# Patient Record
Sex: MALE | Race: WHITE | URBAN - METROPOLITAN AREA
[De-identification: names, ages, dates, MRNs, and addresses within clinical notes are randomized per-mention and may not be internally consistent; named-entity substitution may affect disease eponyms.]

---

## 2017-03-10 RX ORDER — PRAVASTATIN SODIUM 20 MG/1
TABLET ORAL
Qty: 30 TAB | Refills: 5 | Status: SHIPPED | OUTPATIENT
Start: 2017-03-10 | End: 2017-07-08 | Stop reason: SDUPTHER

## 2017-07-07 ENCOUNTER — OFFICE VISIT (OUTPATIENT)
Dept: INTERNAL MEDICINE CLINIC | Age: 57
End: 2017-07-07

## 2017-07-07 VITALS
HEART RATE: 58 BPM | TEMPERATURE: 98 F | RESPIRATION RATE: 16 BRPM | OXYGEN SATURATION: 98 % | WEIGHT: 195 LBS | HEIGHT: 71 IN | DIASTOLIC BLOOD PRESSURE: 82 MMHG | SYSTOLIC BLOOD PRESSURE: 122 MMHG | BODY MASS INDEX: 27.3 KG/M2

## 2017-07-07 DIAGNOSIS — E78.5 HYPERLIPIDEMIA, UNSPECIFIED HYPERLIPIDEMIA TYPE: ICD-10-CM

## 2017-07-07 DIAGNOSIS — Z00.00 WELLNESS EXAMINATION: Primary | ICD-10-CM

## 2017-07-07 DIAGNOSIS — J32.9 SINUSITIS, UNSPECIFIED CHRONICITY, UNSPECIFIED LOCATION: ICD-10-CM

## 2017-07-07 DIAGNOSIS — I10 ESSENTIAL HYPERTENSION: ICD-10-CM

## 2017-07-07 DIAGNOSIS — R97.20 RISING PSA LEVEL: ICD-10-CM

## 2017-07-07 DIAGNOSIS — G47.33 OSA (OBSTRUCTIVE SLEEP APNEA): Chronic | ICD-10-CM

## 2017-07-07 RX ORDER — GUAIFENESIN 600 MG/1
600 TABLET, EXTENDED RELEASE ORAL 2 TIMES DAILY
Qty: 14 TAB | Refills: 0 | Status: SHIPPED | OUTPATIENT
Start: 2017-07-07 | End: 2017-07-14

## 2017-07-07 RX ORDER — FLUTICASONE PROPIONATE 50 MCG
2 SPRAY, SUSPENSION (ML) NASAL DAILY
Qty: 1 BOTTLE | Refills: 3 | Status: SHIPPED | OUTPATIENT
Start: 2017-07-07

## 2017-07-07 NOTE — PROGRESS NOTES
Connie Drew is a 61 y.o. male who presents today for No chief complaint on file. Jose Miguel Terrazas 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 Today patient ***. he {DOES_DOES QQS:82217} have other concerns. ROS  ROS    There were no vitals taken for this visit. Physical Exam      Current Outpatient Prescriptions   Medication Sig    pravastatin (PRAVACHOL) 20 mg tablet take 1 tablet by mouth once daily at bedtime    cetirizine (ZYRTEC) 5 mg tablet Take  by mouth.  clonazePAM (KLONOPIN) 1 mg tablet Take  by mouth two (2) times a day. No current facility-administered medications for this visit.          Past Medical History:   Diagnosis Date    Anxiety     Autonomic dysreflexia     Benign essential HTN     Dr. Imani Pratt prescribes clonazepam    Chronic sinusitis     recurrent, s/p 2 sinus surgeries    FH: CAD (coronary artery disease)     Hemorrhoids     banding 2013, 2015. saw Dr. Cindi Mcwilliams 2016    Hyperlipidemia 9/8/2010    Hypoglycemia     Low back pain     L leg    N[facility]psy with cataplexy     [facility]    LD (obstructive sleep apnea)     [facility]    Panic attack       Past Surgical History:   Procedure Laterality Date    COLONOSCOPY  2008    hemorrhoids    HX GI  7/17/14    flex sig    HX HEENT      sinus surgeries x2      Social History   Substance Use Topics    Smoking status: Former Smoker     Packs/day: 0.50     Years: 6.00     Types: Cigarettes     Quit date: 4/10/2002    Smokeless tobacco: Never Used    Alcohol use 1.0 oz/week     2 Cans of beer per week      Comment: very seldom       Family History   Problem Relation Age of Onset    Anxiety Mother     Breast Cancer Mother     Heart Disease Father      CABG, CAD    Hypertension Brother     High Cholesterol Brother     Hypertension Sister     Diabetes Maternal Uncle     Heart Attack Brother      stent    Cancer Neg Hx         Allergies   Allergen Reactions    Bactrim Ds [Sulfamethoxazole-Trimethoprim] Hives    Augmentin [Amoxicillin-Pot Clavulanate] Nausea Only    Novocain [Procaine] Palpitations    Paxil [Paroxetine Hcl] Other (comments)     Libido problems        Prednisone Other (comments)     tachycardia      Simvastatin Other (comments)     Sexual effects        Assessment/Plan  {Assessment and Plan Chronic Disease:82976}    Follow-up Disposition: Not on File    Callie Valles MD  7/7/2017

## 2017-07-07 NOTE — PROGRESS NOTES
Risa Sam is a 61 y.o. male who presents today for Complete Physical (pt is fasting)  . 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 Today patient is here for CPE. he does have other concerns. Anxiety: Dr. Vanesa Dobson with [facility]. On Klonopin 0.5 TID. Stable on this. Has had some ear issue. Fullness, equilibrium is a bit off. Has had two cefdinir Rx. Continued issues. LD: on CPAP,     Hyperlipidemia  Currently he takes 20 mg of pravastatin. ROS: taking medications as instructed, no medication side effects noted  No new myalgias, no joint pains, no weakness  No TIA's, no chest pain on exertion, no dyspnea on exertion, no swelling of ankles. Lab Results   Component Value Date/Time    Cholesterol, total 258 06/27/2016 09:30 AM    HDL Cholesterol 62 06/27/2016 09:30 AM    LDL, calculated 173 06/27/2016 09:30 AM    VLDL, calculated 23 06/27/2016 09:30 AM    Triglyceride 114 06/27/2016 09:30 AM       Health maintenance hx includes:  Exercise: moderately active. Diet: generally follows a low fat low cholesterol diet, nonsmoker, alcohol intake socially  Social: Works at [employer] with [employer]. One adult child. No grandchildren. Cancer screening:    Colon cancer screening:  Last Colonoscopy: 2008 and was normal   Prostate cancer screening: PSA and/or JASON: 2016 and was normal, but had some increase. Immunizations:     Immunization History   Administered Date(s) Administered    TD Vaccine 08/04/2005    Tdap 04/01/2016      Immunization status: up to date and documented.       ROS  Review of Systems   Constitutional: Negative for chills, fever, malaise/fatigue and weight loss. HENT: Positive for congestion, ear pain and hearing loss. Negative for sore throat and tinnitus. Eyes: Negative for blurred vision, double vision, photophobia, pain and discharge. Respiratory: Negative for cough, hemoptysis, sputum production, shortness of breath and wheezing. Cardiovascular: Negative for chest pain, palpitations, orthopnea, claudication and leg swelling. Gastrointestinal: Negative for abdominal pain, blood in stool, constipation, diarrhea, heartburn, nausea and vomiting. Genitourinary: Negative for dysuria, frequency, hematuria and urgency. Musculoskeletal: Negative for back pain, joint pain, myalgias and neck pain. Skin: Negative for itching and rash. Endo/Heme/Allergies: Does not bruise/bleed easily. Psychiatric/Behavioral: Negative for depression, memory loss and suicidal ideas. Visit Vitals    /86 (BP 1 Location: Left arm, BP Patient Position: Sitting)    Pulse (!) 58    Temp 98 °F (36.7 °C) (Oral)    Resp 16    Ht 5' 11\" (1.803 m)    Wt 195 lb (88.5 kg)    SpO2 98%    BMI 27.2 kg/m2       Physical Exam   Constitutional: He appears well-developed and well-nourished. HENT:   Head: Normocephalic and atraumatic. Right Ear: External ear normal.   Left Ear: External ear normal.   Fluid behind TM   Eyes: EOM are normal. Pupils are equal, round, and reactive to light. Neck: Normal range of motion. Neck supple. Cardiovascular: Normal rate and regular rhythm. No murmur heard. Pulmonary/Chest: Effort normal and breath sounds normal. No respiratory distress. Abdominal: Soft. Bowel sounds are normal. He exhibits no distension. Current Outpatient Prescriptions   Medication Sig    pravastatin (PRAVACHOL) 20 mg tablet take 1 tablet by mouth once daily at bedtime    cetirizine (ZYRTEC) 5 mg tablet Take  by mouth.     clonazePAM (KLONOPIN) 1 mg tablet Take  by mouth two (2) times a day. No current facility-administered medications for this visit. Past Medical History:   Diagnosis Date    Anxiety     Autonomic dysreflexia     Benign essential HTN     Dr. Ling Montes prescribes clonazepam    Chronic sinusitis     recurrent, s/p 2 sinus surgeries    FH: CAD (coronary artery disease)     Hemorrhoids     banding 2013, 2015. saw Dr. Tio Coffey 2016    Hyperlipidemia 9/8/2010    Hypoglycemia     Low back pain     L leg    N[facility]psy with cataplexy     [facility]    LD (obstructive sleep apnea)     [facility]    Panic attack       Past Surgical History:   Procedure Laterality Date    COLONOSCOPY  2008    hemorrhoids    HX GI  7/17/14    flex sig    HX HEENT      sinus surgeries x2      Social History   Substance Use Topics    Smoking status: Former Smoker     Packs/day: 0.50     Years: 6.00     Types: Cigarettes     Quit date: 4/10/2002    Smokeless tobacco: Never Used    Alcohol use 1.0 oz/week     2 Cans of beer per week      Comment: very seldom       Family History   Problem Relation Age of Onset    Anxiety Mother     Breast Cancer Mother     Heart Disease Father      CABG, CAD    Hypertension Brother     High Cholesterol Brother     Hypertension Sister     Diabetes Maternal Uncle     Heart Attack Brother      stent    Cancer Neg Hx         Allergies   Allergen Reactions    Bactrim Ds [Sulfamethoxazole-Trimethoprim] Hives    Augmentin [Amoxicillin-Pot Clavulanate] Nausea Only    Novocain [Procaine] Palpitations    Paxil [Paroxetine Hcl] Other (comments)     Libido problems        Prednisone Other (comments)     tachycardia      Simvastatin Other (comments)     Sexual effects        Assessment/Plan  Cathie Hogan was seen today for complete physical.    Diagnoses and all orders for this visit:    Wellness examination - PSA was rising, repeat. Pt to continue physical activity. C-scope due next year.    Yessi Suarez was counseled on age-appropriate/ guideline-based risk prevention behaviors and screening for a 61y.o. year old   male . We also discussed adjustments in screening based on family history if necessary. Printed instructions for preventative screening guidelines and healthy behaviors given to patient with after visit summary.    -     METABOLIC PANEL, COMPREHENSIVE    Hyperlipidemia, unspecified hyperlipidemia type - on pravachol 20mg. Check levels. -     LIPID PANEL    Essential hypertension - diet/lifestyle controlled. Rising PSA level  -     PSA W/ REFLX FREE PSA    LD (obstructive sleep apnea) - on CPAP    Sinusitis, unspecified chronicity, unspecified location - s/p two rounds of omnicef. No new infectious symptoms. Try nasal steroids/mucinex. ENT if not better. -     fluticasone (FLONASE) 50 mcg/actuation nasal spray; 2 Sprays by Both Nostrils route daily.  -     guaiFENesin ER (MUCINEX) 600 mg ER tablet; Take 1 Tab by mouth two (2) times a day for 7 days.         Follow-up Disposition: Not on File    Lela Mendez MD  7/7/2017

## 2017-07-07 NOTE — MR AVS SNAPSHOT
Visit Information Date & Time Provider Department Dept. Phone Encounter #  
 7/7/2017  8:30 AM Garcia Whiting MD [facility] [address] [place] [id number] Upcoming Health Maintenance Date Due COLONOSCOPY 1/1/2018 INFLUENZA AGE 9 TO ADULT 8/1/2017 DTaP/Tdap/Td series (2 - Td) 4/1/2026 Allergies as of 7/7/2017  Review Complete On: 7/7/2017 By: Garcia Whiting MD  
  
 Severity Noted Reaction Type Reactions Bactrim Ds [Sulfamethoxazole-trimethoprim] High 02/02/2011   Systemic Hives Augmentin [Amoxicillin-pot Clavulanate]  04/10/2009    Nausea Only Novocain [Procaine]  04/10/2009    Palpitations Paxil [Paroxetine Hcl]  04/10/2009    Other (comments) Libido problems Prednisone  04/10/2009    Other (comments)  
 tachycardia Simvastatin  10/03/2013    Other (comments) Sexual effects Current Immunizations  Reviewed on 4/1/2016 Name Date  
 TD Vaccine 8/4/2005 Tdap 4/1/2016 Not reviewed this visit You Were Diagnosed With   
  
 Codes Comments Wellness examination    -  Primary ICD-10-CM: Z00.00 ICD-9-CM: V70.0 Hyperlipidemia, unspecified hyperlipidemia type     ICD-10-CM: E78.5 ICD-9-CM: 272.4 Essential hypertension     ICD-10-CM: I10 
ICD-9-CM: 401.9 Rising PSA level     ICD-10-CM: R97.20 ICD-9-CM: 790.93   
 LD (obstructive sleep apnea)     ICD-10-CM: G47.33 
ICD-9-CM: 327.23 Sinusitis, unspecified chronicity, unspecified location     ICD-10-CM: J32.9 ICD-9-CM: 473.9 Vitals BP Pulse Temp Resp Height(growth percentile) Weight(growth percentile) 122/82 (BP 1 Location: Left arm, BP Patient Position: Sitting) (!) 58 98 °F (36.7 °C) (Oral) 16 5' 11\" (1.803 m) 195 lb (88.5 kg) SpO2 BMI Smoking Status 98% 27.2 kg/m2 Former Smoker Vitals History BMI and BSA Data Body Mass Index Body Surface Area  
 27.2 kg/m 2 2.11 m 2 [facility]erred [facility] [address] [facility] [address] [phone] Your Updated Medication List  
  
   
This list is accurate as of: 17 10:01 AM.  Always use your most recent med list.  
  
  
  
  
 cetirizine 5 mg tablet Commonly known as:  ZYRTEC Take  by mouth.  
  
 clonazePAM 1 mg tablet Commonly known as:  Kaykay Cord Take  by mouth two (2) times a day. fluticasone 50 mcg/actuation nasal spray Commonly known as:  Cristi Calk 2 Sprays by Both Nostrils route daily. guaiFENesin  mg ER tablet Commonly known as:  Manuel & Manuel Take 1 Tab by mouth two (2) times a day for 7 days. pravastatin 20 mg tablet Commonly known as:  PRAVACHOL  
take 1 tablet by mouth once daily at bedtime Prescriptions Sent to Pharmacy Refills  
 fluticasone (FLONASE) 50 mcg/actuation nasal spray 3 Si Sprays by Both Nostrils route daily. Class: Normal  
 [facility] #: [phone] Route: Both Nostrils  
 guaiFENesin ER (MUCINEX) 600 mg ER tablet 0 Sig: Take 1 Tab by mouth two (2) times a day for 7 days. Class: Normal  
 [facility] Ph #: [phone] Route: Oral  
  
We Performed the Following LIPID PANEL [84215 CPT(R)] METABOLIC PANEL, COMPREHENSIVE [98373 CPT(R)] PSA W/ REFLX FREE PSA [73462 CPT(R)] Patient Instructions Well Visit, Men 48 to 72: Care Instructions Your Care Instructions Physical exams can help you stay healthy. Your doctor has checked your overall health and may have suggested ways to take good care of yourself. He or she also may have recommended tests. At home, you can help prevent illness with healthy eating, regular exercise, and other steps. Follow-up care is a key part of your treatment and safety.  Be sure to make and go to all appointments, and call your doctor if you are having problems. It's also a good idea to know your test results and keep a list of the medicines you take. How can you care for yourself at home? · Reach and stay at a healthy weight. This will lower your risk for many problems, such as obesity, diabetes, heart disease, and high blood pressure. · Get at least 30 minutes of exercise on most days of the week. Walking is a good choice. You also may want to do other activities, such as running, swimming, cycling, or playing tennis or team sports. · Do not smoke. Smoking can make health problems worse. If you need help quitting, talk to your doctor about stop-smoking programs and medicines. These can increase your chances of quitting for good. · Protect your skin from too much sun. When you're outdoors from 10 a.m. to 4 p.m., stay in the shade or cover up with clothing and a hat with a wide brim. Wear sunglasses that block UV rays. Even when it's cloudy, put broad-spectrum sunscreen (SPF 30 or higher) on any exposed skin. · See a dentist one or two times a year for checkups and to have your teeth cleaned. · Wear a seat belt in the car. · Limit alcohol to 2 drinks a day. Too much alcohol can cause health problems. Follow your doctor's advice about when to have certain tests. These tests can spot problems early. · Cholesterol. Your doctor will tell you how often to have this done based on your overall health and other things that can increase your risk for heart attack and stroke. · Blood pressure. Have your blood pressure checked during a routine doctor visit. Your doctor will tell you how often to check your blood pressure based on your age, your blood pressure results, and other factors. · Prostate exam. Talk to your doctor about whether you should have a blood test (called a PSA test) for prostate cancer.  Experts disagree on whether men should have this test. Some experts recommend that you discuss the benefits and risks of the test with your doctor. · Diabetes. Ask your doctor whether you should have tests for diabetes. · Vision. Some experts recommend that you have yearly exams for glaucoma and other age-related eye problems starting at age 48. · Hearing. Tell your doctor if you notice any change in your hearing. You can have tests to find out how well you hear. · Colon cancer. You should begin tests for colon cancer at age 48. You may have one of several tests. Your doctor will tell you how often to have tests based on your age and risk. Risks include whether you already had a precancerous polyp removed from your colon or whether your parent, brother, sister, or child has had colon cancer. · Heart attack and stroke risk. At least every 4 to 6 years, you should have your risk for heart attack and stroke assessed. Your doctor uses factors such as your age, blood pressure, cholesterol, and whether you smoke or have diabetes to show what your risk for a heart attack or stroke is over the next 10 years. · Abdominal aortic aneurysm. Ask your doctor whether you should have a test to check for an aneurysm. You may need a test if you ever smoked or if your parent, brother, sister, or child has had an aneurysm. When should you call for help? Watch closely for changes in your health, and be sure to contact your doctor if you have any problems or symptoms that concern you. Where can you learn more? Go to http://day-susana.info/. Enter P885 in the search box to learn more about \"Well Visit, Men 48 to 72: Care Instructions. \" Current as of: July 19, 2016 Content Version: 11.3 © 4361-3365 Healthwise, Incorporated. Care instructions adapted under license by [employer] (which disclaims liability or warranty for this information).  If you have questions about a medical condition or this instruction, always ask your healthcare professional. [address] any warranty or liability for your use of this information. Introducing [facility]! Dear Nadeem Ybarra: Thank you for requesting a [employer] account. Our records indicate that you already have an active [employer] account. You can access your account anytime at [email or web address] Did you know that you can access your hospital and ER discharge instructions at any time in [employer]? You can also review all of your test results from your hospital stay or ER visit. Additional Information If you have questions, please visit the Frequently Asked Questions section of the [employer] website at [email or web address] Remember, [employer] is NOT to be used for urgent needs. For medical emergencies, dial 911. Now available from your iPhone and Android! Please provide this summary of care documentation to your next provider. Your primary care clinician is listed as Kaelyn Sotelo. If you have any questions after today's visit, please call [phone].

## 2017-07-07 NOTE — PATIENT INSTRUCTIONS

## 2017-07-08 DIAGNOSIS — E78.5 HYPERLIPIDEMIA, UNSPECIFIED HYPERLIPIDEMIA TYPE: Primary | ICD-10-CM

## 2017-07-08 LAB
ALBUMIN SERPL-MCNC: 4.5 G/DL (ref 3.5–5.5)
ALBUMIN/GLOB SERPL: 1.6 {RATIO} (ref 1.2–2.2)
ALP SERPL-CCNC: 73 IU/L (ref 39–117)
ALT SERPL-CCNC: 23 IU/L (ref 0–44)
AST SERPL-CCNC: 20 IU/L (ref 0–40)
BILIRUB SERPL-MCNC: 1.1 MG/DL (ref 0–1.2)
BUN SERPL-MCNC: 12 MG/DL (ref 6–24)
BUN/CREAT SERPL: 15 (ref 9–20)
CALCIUM SERPL-MCNC: 9.7 MG/DL (ref 8.7–10.2)
CHLORIDE SERPL-SCNC: 102 MMOL/L (ref 96–106)
CHOLEST SERPL-MCNC: 234 MG/DL (ref 100–199)
CO2 SERPL-SCNC: 26 MMOL/L (ref 18–29)
CREAT SERPL-MCNC: 0.81 MG/DL (ref 0.76–1.27)
GLOBULIN SER CALC-MCNC: 2.9 G/DL (ref 1.5–4.5)
GLUCOSE SERPL-MCNC: 85 MG/DL (ref 65–99)
HDLC SERPL-MCNC: 52 MG/DL
INTERPRETATION, 910389: NORMAL
LDLC SERPL CALC-MCNC: 152 MG/DL (ref 0–99)
POTASSIUM SERPL-SCNC: 5.2 MMOL/L (ref 3.5–5.2)
PROT SERPL-MCNC: 7.4 G/DL (ref 6–8.5)
PSA SERPL-MCNC: 0.8 NG/ML (ref 0–4)
REFLEX CRITERIA: NORMAL
SODIUM SERPL-SCNC: 143 MMOL/L (ref 134–144)
TRIGL SERPL-MCNC: 151 MG/DL (ref 0–149)
VLDLC SERPL CALC-MCNC: 30 MG/DL (ref 5–40)

## 2017-07-08 RX ORDER — PRAVASTATIN SODIUM 40 MG/1
40 TABLET ORAL
Qty: 90 TAB | Refills: 3 | Status: SHIPPED | OUTPATIENT
Start: 2017-07-08 | End: 2017-11-07 | Stop reason: SDUPTHER

## 2017-07-10 ENCOUNTER — TELEPHONE (OUTPATIENT)
Dept: INTERNAL MEDICINE CLINIC | Age: 57
End: 2017-07-10

## 2017-07-10 ENCOUNTER — DOCUMENTATION ONLY (OUTPATIENT)
Dept: INTERNAL MEDICINE CLINIC | Age: 57
End: 2017-07-10

## 2017-11-07 DIAGNOSIS — E78.5 HYPERLIPIDEMIA, UNSPECIFIED HYPERLIPIDEMIA TYPE: ICD-10-CM

## 2017-11-07 RX ORDER — PRAVASTATIN SODIUM 40 MG/1
40 TABLET ORAL
Qty: 90 TAB | Refills: 1 | Status: SHIPPED | OUTPATIENT
Start: 2017-11-07 | End: 2018-08-22 | Stop reason: SDUPTHER

## 2017-11-07 NOTE — TELEPHONE ENCOUNTER
Please call in his Pravastatin he is out to the [facility] at [phone]    His no [phone]

## 2020-01-13 ENCOUNTER — TELEPHONE (OUTPATIENT)
Dept: INTERNAL MEDICINE CLINIC | Age: 60
End: 2020-01-13

## 2020-01-13 NOTE — TELEPHONE ENCOUNTER
Dr. Jorge Estrada   Received: Today   Message Contents   Luz Elena Lee sent to [employer]  Phone Number: [phone]         Caller's first and last name and relationship to patient (if not the patient): n/a   Best contact number: [phone]   Preferred date and time: 1/13/20 Anytime   Scheduled appointment date and time: N/A   Reason for appointment: Patient diagnosed by [facility] with walking Pneumonia   Details to clarify the request: Patient is currently out of work on [employer] and needs an appt. as soon as possible for continued treatment.

## 2020-01-14 NOTE — TELEPHONE ENCOUNTER
FYI: Called pt. He was not able to take Dr. Joya Leal cancellation slot as he was still in bed and did not want to see any other provider. He stated he will go somewhere else. Thanks.

## 2023-02-17 ENCOUNTER — TRANSCRIBE ORDER (OUTPATIENT)
Dept: SCHEDULING | Age: 63
End: 2023-02-17

## 2023-02-17 DIAGNOSIS — I65.23 CALCIFICATION OF BOTH CAROTID ARTERIES: Primary | ICD-10-CM

## 2025-03-31 ENCOUNTER — TELEPHONE (OUTPATIENT)
Dept: PHARMACY | Facility: CLINIC | Age: 65
End: 2025-03-31

## 2025-03-31 NOTE — TELEPHONE ENCOUNTER
[facility] CLINICAL PHARMACY: ADHERENCE REVIEW  Identified care gap per [place]: fills at [facility]: Statin adherence    Patient also appears to be prescribed: ACE/ARB    ASSESSMENT  STATIN ADHERENCE    Insurance Records claims through 3/24/25 (Prior Year PDC = not reported; YTD PDC = FIRST FILL; Potential Fail Date: 04/10/25):   ROSUVASTATIN CALCIUM 40 MG TAB last filled on 25 for 26 day supply. Next refill due: 25    Prescribed si tablet/capsule daily    Per Insurer Portal: last filled on  for 26 day supply.     Per [facility]: not contacted    No results found for: \"CHOL\", \"TRIG\", \"HDL\", \"LDLDIRECT\"  No results found for: \"LDL\", \"LDLDIRECT\"   No results found for: \"ALT\", \"AST\"  The ASCVD Risk score (Elva MAHARAJ, et al., 2019) failed to calculate for the following reasons:    The systolic blood pressure is missing    Cannot find a previous HDL lab    Cannot find a previous total cholesterol lab    The smoking status is invalid     PLAN    The following are interventions that have been identified:   Patient OVERDUE refilling ROSUVASTATIN CALCIUM 40 MG TAB and active on home medication list.   Patient eligible for 100 day supply of ROSUVASTATIN CALCIUM 40 MG TAB    Attempting to reach patient to review - unable to leave message. Letter sent to patient.    Last Visit: none  Next Visit: none    [facility]   [facility]  [phone] Option 1     For Pharmacy Admin Tracking Only    Program: [employer]  CPA in place:  No  Gap Closed?: No   Time Spent (min): 5